# Patient Record
Sex: MALE | Race: WHITE | HISPANIC OR LATINO | Employment: OTHER
[De-identification: names, ages, dates, MRNs, and addresses within clinical notes are randomized per-mention and may not be internally consistent; named-entity substitution may affect disease eponyms.]

---

## 2018-07-11 ENCOUNTER — RECORDS - HEALTHEAST (OUTPATIENT)
Dept: ADMINISTRATIVE | Facility: OTHER | Age: 33
End: 2018-07-11

## 2018-07-16 ENCOUNTER — OFFICE VISIT - HEALTHEAST (OUTPATIENT)
Dept: SURGERY | Facility: CLINIC | Age: 33
End: 2018-07-16

## 2018-07-16 DIAGNOSIS — K42.9 UMBILICAL HERNIA WITHOUT OBSTRUCTION AND WITHOUT GANGRENE: ICD-10-CM

## 2018-07-16 DIAGNOSIS — K40.90 RIGHT INGUINAL HERNIA: ICD-10-CM

## 2018-07-16 ASSESSMENT — MIFFLIN-ST. JEOR: SCORE: 1688.56

## 2018-07-26 ENCOUNTER — ANESTHESIA - HEALTHEAST (OUTPATIENT)
Dept: SURGERY | Facility: AMBULATORY SURGERY CENTER | Age: 33
End: 2018-07-26

## 2018-07-27 ENCOUNTER — SURGERY - HEALTHEAST (OUTPATIENT)
Dept: SURGERY | Facility: AMBULATORY SURGERY CENTER | Age: 33
End: 2018-07-27

## 2018-07-27 ASSESSMENT — MIFFLIN-ST. JEOR: SCORE: 1712.37

## 2021-06-01 VITALS — WEIGHT: 184 LBS | BODY MASS INDEX: 29.57 KG/M2 | HEIGHT: 66 IN

## 2021-06-01 VITALS — BODY MASS INDEX: 30.66 KG/M2 | WEIGHT: 184 LBS | HEIGHT: 65 IN

## 2021-06-16 PROBLEM — K40.90 RIGHT INGUINAL HERNIA: Status: ACTIVE | Noted: 2018-07-16

## 2021-06-19 NOTE — PROGRESS NOTES
"HPI:  This is a 33 y.o. male here today with concerns of pain and bulging in his right groin and umbilicus area. He has noted this for the past few month(s). The symptoms have progressed and increased over this time. He comes in for evaluation secondary to the hernia causing enough issues to bother them with daily activities or chores.    Allergies:Review of patient's allergies indicates no known allergies.    History reviewed. No pertinent past medical history.    Past Surgical History:   Procedure Laterality Date     HIP SURGERY         CURRENT MEDS:      Family History   Problem Relation Age of Onset     No Medical Problems Mother      No Medical Problems Father         reports that he has never smoked. He has never used smokeless tobacco. He reports that he drinks alcohol. He reports that he does not use illicit drugs.    Review of Systems - Negative except right groin pain, Otherwise twelve system of review is negative.      Vitals:    07/16/18 1033   BP: 123/81   Patient Site: Left Arm   Patient Position: Sitting   Cuff Size: Adult Regular   Pulse: 73   SpO2: 96%   Weight: 184 lb (83.5 kg)   Height: 5' 4.5\" (1.638 m)       Body mass index is 31.1 kg/(m^2).    EXAM:  General: NAD   HEENT: normocephalic, PERRLA and EOMS intact  Mounth: Mucus membranes moist  Neck: Supple  Chest: Clear to auscultation bilaterally  CV: RRR  ABD: Soft nontender and nondistended, right inguinal hernia and umbilical hernia, reducible  EXT: Warm, pulses intact,   Neuro: Alert and oriented x3  Back: no CVA tenderness    Assessment/Plan: Pt with a right inguinal hernia and umbilical hernia. I discussed this at length with He.  I went over conservative management as well as surgical treatment of this.. I would plan on doing this via anlaparoscopic  approach with possible use of mesh. I went over the small risks of surgery including but not limited to bleeding and infection, anesthesia, recurrence rates and nerve injury. I discussed the " expected recovery time as well. Will get this scheduled. He will contact us to have this scheduled.      Fausto Aviles MD  [facility] Department of Surgery

## 2021-06-19 NOTE — ANESTHESIA CARE TRANSFER NOTE
Last vitals:   Vitals:    07/27/18 0835   BP: (P) 121/75   Pulse: (P) 77   Resp: (P) 16   Temp: (!) (P) 35.8  C (96.5  F)   SpO2: (P) 100%     Patient's level of consciousness is drowsy  Spontaneous respirations: yes  Maintains airway independently: yes  Dentition unchanged: yes  Oropharynx: oropharynx clear of all foreign objects    QCDR Measures:  ASA# 20 - Surgical Safety Checklist: WHO surgical safety checklist completed prior to induction  PQRS# 430 - Adult PONV Prevention: 4558F - Pt received => 2 anti-emetic agents (different classes) preop & intraop  ASA# 8 - Peds PONV Prevention: NA - Not pediatric patient, not GA or 2 or more risk factors NOT present  PQRS# 424 - Promise-op Temp Management: 4559F - At least one body temp DOCUMENTED => 35.5C or 95.9F within required timeframe  PQRS# 426 - PACU Transfer Protocol: - Transfer of care checklist used  ASA# 14 - Acute Post-op Pain: ASA14B - Patient did NOT experience pain >= 7 out of 10

## 2021-06-19 NOTE — ANESTHESIA POSTPROCEDURE EVALUATION
Patient: Binu Rivers  REPAIR, HERNIA, INGUINAL, LAPAROSCOPIC, REPAIR, HERNIA, UMBILICAL, OPEN  Anesthesia type: general    Patient location: PACU  Last vitals:   Vitals:    07/27/18 0947   BP: 126/86   Pulse: (!) 53   Resp: 16   Temp:    SpO2: 98%     Post vital signs: stable  Level of consciousness: awake and responds to simple questions  Post-anesthesia pain: pain controlled  Post-anesthesia nausea and vomiting: no  Pulmonary: unassisted, return to baseline  Cardiovascular: stable and blood pressure at baseline  Hydration: adequate  Anesthetic events: no    QCDR Measures:  ASA# 11 - Promise-op Cardiac Arrest: ASA11B - Patient did NOT experience unanticipated cardiac arrest  ASA# 12 - Promsie-op Mortality Rate: ASA12B - Patient did NOT die  ASA# 13 - PACU Re-Intubation Rate: ASA13B - Patient did NOT require a new airway mgmt  ASA# 10 - Composite Anes Safety: ASA10A - No serious adverse event    Additional Notes:

## 2021-06-19 NOTE — ANESTHESIA PREPROCEDURE EVALUATION
Anesthesia Evaluation      Patient summary reviewed   No history of anesthetic complications     Airway   Mallampati: II   Pulmonary - negative ROS and normal exam                          Cardiovascular - negative ROS  Rhythm: regular  Rate: normal,         Neuro/Psych - negative ROS     Endo/Other - negative ROS      GI/Hepatic/Renal - negative ROS           Dental - normal exam                        Anesthesia Plan  Planned anesthetic: general endotracheal    ASA 2   Induction: intravenous   Anesthetic plan and risks discussed with: patient and  services used    Post-op plan: routine recovery

## 2023-03-03 ENCOUNTER — APPOINTMENT (OUTPATIENT)
Dept: RADIOLOGY | Facility: HOSPITAL | Age: 38
End: 2023-03-03
Attending: PHYSICIAN ASSISTANT

## 2023-03-03 ENCOUNTER — HOSPITAL ENCOUNTER (EMERGENCY)
Facility: HOSPITAL | Age: 38
Discharge: HOME | End: 2023-03-03
Admitting: PHYSICIAN ASSISTANT

## 2023-03-03 VITALS
BODY MASS INDEX: 32.14 KG/M2 | HEART RATE: 80 BPM | SYSTOLIC BLOOD PRESSURE: 123 MMHG | HEIGHT: 66 IN | OXYGEN SATURATION: 98 % | RESPIRATION RATE: 16 BRPM | WEIGHT: 200 LBS | TEMPERATURE: 98.4 F | DIASTOLIC BLOOD PRESSURE: 87 MMHG
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
## 2023-03-03 DIAGNOSIS — S20.212A CONTUSION OF RIB ON LEFT SIDE, INITIAL ENCOUNTER: ICD-10-CM

## 2023-03-03 PROCEDURE — 99283 EMERGENCY DEPT VISIT LOW MDM: CPT

## 2023-03-03 PROCEDURE — 250N000013 HC RX MED GY IP 250 OP 250 PS 637: Performed by: PHYSICIAN ASSISTANT

## 2023-03-03 PROCEDURE — 99284 EMERGENCY DEPT VISIT MOD MDM: CPT | Mod: 25

## 2023-03-03 PROCEDURE — 71101 X-RAY EXAM UNILAT RIBS/CHEST: CPT | Mod: LT

## 2023-03-03 RX ORDER — OXYCODONE AND ACETAMINOPHEN 5; 325 MG/1; MG/1
1 TABLET ORAL EVERY 6 HOURS PRN
Qty: 10 TABLET | Refills: 0 | Status: SHIPPED | OUTPATIENT
Start: 2023-03-03

## 2023-03-03 RX ORDER — LIDOCAINE 4 G/G
1 PATCH TOPICAL ONCE
Status: DISCONTINUED | OUTPATIENT
Start: 2023-03-03 | End: 2023-03-03 | Stop reason: HOSPADM

## 2023-03-03 RX ORDER — IBUPROFEN 600 MG/1
600 TABLET, FILM COATED ORAL ONCE
Status: COMPLETED | OUTPATIENT
Start: 2023-03-03 | End: 2023-03-03

## 2023-03-03 RX ADMIN — LIDOCAINE 1 PATCH: 4 PATCH TOPICAL at 13:06

## 2023-03-03 RX ADMIN — IBUPROFEN 600 MG: 600 TABLET, FILM COATED ORAL at 13:06

## 2023-03-03 ASSESSMENT — ENCOUNTER SYMPTOMS
SHORTNESS OF BREATH: 0
BACK PAIN: 0
HEADACHES: 0
DIZZINESS: 0
NECK PAIN: 0
VOMITING: 0
ABDOMINAL PAIN: 0

## 2023-03-03 ASSESSMENT — ACTIVITIES OF DAILY LIVING (ADL): ADLS_ACUITY_SCORE: 35

## 2023-03-03 NOTE — ED PROVIDER NOTES
Emergency Department Encounter   NAME: Binu Rivers ; AGE: 38 year old male ; YOB: 1985 ; MRN: [id number] ; PCP: No primary care provider on file.   ED PROVIDER: Ursula Cruz PA-C    Evaluation Date & Time:   No admission date for patient encounter.    CHIEF COMPLAINT:  Fall      Impression and Plan   MDM: Binu Rivers is a 38 year old male with no pertinent history on file who presents to the ED by walk in for evaluation of a fall, chest wall pain.  The patient presents to the emergency department for evaluation of left-sided chest wall pain after a mechanical slip and fall on the ice last week.  Here in the ER, he is experiencing pain with twisting, certain movements, sleeping, and deep inspiration has reproducible tenderness over his lateral left rib cage.  Differential diagnosis includes was not limited to musculoskeletal strain, rib contusions, rib fractures, rib dislocation, traumatic pneumothorax and others.  X-ray imaging of his ribs and chest ordered.  He does not have any tenderness in the abdomen or flank concerning for intra-abdominal solid organ injury or hemorrhage.  No midline spinal tenderness.  He denies hitting his head, no headache, LOC, or neck pain.  No concern for traumatic intracranial or cervical spine injury.  Patient did drive himself to the ER, and will treat with Lidoderm patch and oral ibuprofen so that he is able to return home today.    X-ray returned and shows no evidence of fractures hemothorax or pneumothorax.  We did discuss the limitations of the x-ray, and possibility of missed occult rib fractures and patient verbalized understanding.  Suspect pain is likely due to rib contusion vs. Occult rib fracture. We discussed plan for motrin, ice/heat, lidoderm patches and prescription for Percocet as needed for breakthrough pain. Medication side effects discussed as well as plan for a 1 week follow up in clinic. We discussed splinting, deep inspirations  throughout the day - incentive spirometer provided. We reviewed concerning signs and symptoms to return to the ED - patient verbalized understanding and comfortable with the plan.     *Offered patient professional Azeri interpretor, however he preferred to speak English.       Medical Decision Making    History:    Supplemental history from: Documented in chart, if applicable    External Record(s) reviewed: Documented in chart, if applicable.    Work Up:    Chart documentation includes differential considered and any EKGs or imaging independently interpreted by provider, where specified.    In additional to work up documented, I considered the following work up: Documented in chart, if applicable.    External consultation:    Discussion of management with another provider: Documented in chart, if applicable    Complicating factors:    Care impacted by chronic illness: N/A    Care affected by social determinants of health: N/A    Disposition considerations: Discharge. I prescribed additional prescription strength medication(s) as charted. See documentation for any additional details.    ED COURSE:  12:10 PM I met and introduced myself to the patient. I gathered initial history and performed my physical exam. We discussed plan for initial workup.   1:00 PM Updated patient on imaging results. Discussed plan for discharge.    At the conclusion of the encounter I discussed the results of all the tests and the disposition. The questions were answered. The patient or family acknowledged understanding and was agreeable with the care plan.    FINAL IMPRESSION:    ICD-10-CM    1. Fall due to slipping on ice or snow, initial encounter  W00.9XXA       2. Contusion of rib on left side, initial encounter  S20.212A             MEDICATIONS GIVEN IN THE EMERGENCY DEPARTMENT:  Medications   Lidocaine (LIDOCARE) 4 % Patch 1 patch (1 patch Transdermal $Patch/Med Applied 3/3/23 1306)   ibuprofen (ADVIL/MOTRIN) tablet 600 mg (600 mg  Oral $Given 3/3/23 2710)         NEW PRESCRIPTIONS STARTED AT TODAY'S ED VISIT:  New Prescriptions    OXYCODONE-ACETAMINOPHEN (PERCOCET) 5-325 MG TABLET    Take 1 tablet by mouth every 6 hours as needed for pain         HPI   Patient information was obtained from: Patient     Use of Intrepreter: Yes (Phone) - Language Mohawk     Binu Rivers is a 38 year old male with no pertinent history on file who presents to the ED by walk in for evaluation of a fall, chest wall pain.     The patient reports that last Friday (2/24), he slipped on the ice and fell onto his left side. Endorses left-sided chest wall pain that is exacerbated with deep breaths and twisting. He did not hit his head. The patient is not on blood thinning medication. Ibuprofen has provided no relief, so he took one of his wife's old oxycodone's which did help relieve his pain. No medical problems noted. No tobacco use.    He otherwise denies headache, neck pain, back pain, abdominal pain, or any other complaints at this time.    REVIEW OF SYSTEMS:  Review of Systems   Respiratory: Negative for shortness of breath.    Gastrointestinal: Negative for abdominal pain and vomiting.   Musculoskeletal: Negative for back pain and neck pain.        Positive for chest wall pain (left).   Neurological: Negative for dizziness, syncope and headaches.   All other systems reviewed and are negative.      Medical History     History reviewed. No pertinent past medical history.    Past Surgical History:   Procedure Laterality Date     FRACTURE SURGERY       HIP SURGERY       AL LAP,INGUINAL HERNIA REPR,INITIAL Right 7/27/2018    Procedure: REPAIR, HERNIA, INGUINAL, LAPAROSCOPIC;  Surgeon: Fausto Aviles MD;  Location: [facility];  Service: General     [facility] REPAIR UMBILICAL SAAD,5+Y/O,REDUC N/A 7/27/2018    Procedure: REPAIR, HERNIA, UMBILICAL, OPEN;  Surgeon: Fausto Aviles MD;  Location: [facility];  Service: General       Family History  "  Problem Relation Age of Onset     No Known Problems Mother      No Known Problems Father        Social History     Tobacco Use     Smoking status: Never     Smokeless tobacco: Never   Substance Use Topics     Alcohol use: Yes     Alcohol/week: 28.0 standard drinks     Comment: Alcoholic Drinks/day: Noted on preop     Drug use: No       oxyCODONE-acetaminophen (PERCOCET) 5-325 MG tablet  HYDROcodone-acetaminophen (NORCO) 5-325 mg per tablet  sulfamethoxazole-trimethoprim (SEPTRA DS) 800-160 mg per tablet          Physical Exam     First Vitals:  Patient Vitals for the past 24 hrs:   BP Temp Temp src Pulse Resp SpO2 Height Weight   03/03/23 1300 123/87 -- -- 80 16 98 % -- --   03/03/23 1147 (!) 164/99 98.4  F (36.9  C) Oral 80 18 98 % 1.676 m (5' 6\") 90.7 kg (200 lb)         PHYSICAL EXAM:   Physical Exam  Vitals and nursing note reviewed.   Constitutional:       General: He is not in acute distress.     Appearance: Normal appearance. He is not ill-appearing or toxic-appearing.   HENT:      Head: Normocephalic and atraumatic.   Eyes:      Conjunctiva/sclera: Conjunctivae normal.   Cardiovascular:      Rate and Rhythm: Normal rate and regular rhythm.      Heart sounds: Normal heart sounds.   Pulmonary:      Effort: Pulmonary effort is normal. No respiratory distress.      Breath sounds: Normal breath sounds. No wheezing, rhonchi or rales.   Chest:      Chest wall: Tenderness (left lateral ribcage; no step offs, ecchymosis or crepitus) present.   Abdominal:      General: Abdomen is flat. There is no distension.      Palpations: Abdomen is soft.      Tenderness: There is no abdominal tenderness. There is no left CVA tenderness, guarding or rebound.   Musculoskeletal:      Cervical back: Normal range of motion and neck supple.      Comments: No midline spinal tenderness or step offs.    Skin:     General: Skin is warm and dry.   Neurological:      Mental Status: He is alert.         Results     LAB:  All pertinent labs " reviewed and interpreted  Labs Ordered and Resulted from Time of ED Arrival to Time of ED Departure - No data to display    RADIOLOGY:  Ribs XR, unilat 3 views + PA chest,  left   Final Result   IMPRESSION: The visualized heart and lungs are negative. No rib fractures.            I, Teresa Jacques, am serving as a scribe to document services personally performed by Ursula Cruz PA-C, based on my observation and the provider's statements to me. I, Ursula Cruz PA-C attest that Teresa Jacques is acting in a scribe capacity, has observed my performance of the services and has documented them in accordance with my direction.       Ursula Cruz PA-C   Emergency Medicine   [facility] EMERGENCY DEPARTMENT     Ursula Cruz PA-C  03/03/23 4884

## 2023-03-03 NOTE — DISCHARGE INSTRUCTIONS
As we discussed, your chest x-ray did not show any rib fractures though I suspect bruised ribs or a subtle crack in the ribs as a source of your pain.  Please continue ice or heat as well as regularly scheduled Motrin for pain relief.  I will send you home with a prescription for Percocet which is oxycodone with Tylenol for breakthrough pain.  This is a strong pain medicine and can make you drowsy.  You may not take it while working, driving, or operating heavy machinery.  I would advise that you follow-up closely in your primary care clinic in 1 week for recheck.  If it anytime you develop fever, cough, difficulty breathing or worsening pain, please return to the ER for further evaluation.  I will send you home with an incentive spirometer device, and I advised that you use this machine at least 3 times a day to move air through your lower lungs to avoid developing an infection.    Your blood pressure was elevated in the emergencydepartment today and requires recheck and close follow-up in your primary care clinic. Untreated blood pressure can cause serious complications including, but not limited to stroke, heart attack/failure, and kidney disease.  Please make a close follow-up appointment to have this recheck performed. Please return to the emergency department immediately if you develop a severe headache, vision changes, chest pain, shortness of breath, orabdominal pain.

## 2023-03-03 NOTE — ED TRIAGE NOTES
Patient slipped and fell on ice last weekend. Patient is having pain on left flank/ribs. Patient rates pain 8/10. Patient denies LOC. Patient was able to take ibuprofen which was somewhat effective. Patient also states that he took some of his wife's oxycodone which was helpful with sleep.

## 2024-02-12 ENCOUNTER — OFFICE VISIT (OUTPATIENT)
Dept: INTERNAL MEDICINE | Facility: CLINIC | Age: 39
End: 2024-02-12

## 2024-02-12 VITALS
HEART RATE: 74 BPM | OXYGEN SATURATION: 99 % | TEMPERATURE: 98.2 F | RESPIRATION RATE: 16 BRPM | WEIGHT: 197.3 LBS | DIASTOLIC BLOOD PRESSURE: 64 MMHG | HEIGHT: 66 IN | SYSTOLIC BLOOD PRESSURE: 120 MMHG | BODY MASS INDEX: 31.71 KG/M2

## 2024-02-12 DIAGNOSIS — G89.29 CHRONIC PAIN OF BOTH KNEES: Primary | ICD-10-CM

## 2024-02-12 DIAGNOSIS — M25.561 CHRONIC PAIN OF BOTH KNEES: Primary | ICD-10-CM

## 2024-02-12 DIAGNOSIS — M25.562 CHRONIC PAIN OF BOTH KNEES: Primary | ICD-10-CM

## 2024-02-12 PROCEDURE — 99203 OFFICE O/P NEW LOW 30 MIN: CPT | Mod: 25 | Performed by: NURSE PRACTITIONER

## 2024-02-12 PROCEDURE — 20610 DRAIN/INJ JOINT/BURSA W/O US: CPT | Mod: 50 | Performed by: NURSE PRACTITIONER

## 2024-02-12 RX ORDER — TRIAMCINOLONE ACETONIDE 40 MG/ML
40 INJECTION, SUSPENSION INTRA-ARTICULAR; INTRAMUSCULAR ONCE
Status: COMPLETED | OUTPATIENT
Start: 2024-02-12 | End: 2024-02-12

## 2024-02-12 RX ADMIN — TRIAMCINOLONE ACETONIDE 40 MG: 40 INJECTION, SUSPENSION INTRA-ARTICULAR; INTRAMUSCULAR at 16:25

## 2024-02-12 NOTE — PROGRESS NOTES
"  Assessment & Plan   Problem List Items Addressed This Visit    None  Visit Diagnoses       Chronic pain of both knees    -  Primary    Relevant Medications    triamcinolone (KENALOG-40) injection 40 mg (Completed)    triamcinolone (KENALOG-40) injection 40 mg (Completed)    Other Relevant Orders    Physical Therapy Referral    Large Joint/Bursa injection and/or drainage (Shoulder, Knee) (Completed)    Large Joint/Bursa injection and/or drainage (Shoulder, Knee) (Completed)           - Referral for physical therapy       BMI  Estimated body mass index is 31.85 kg/m  as calculated from the following:    Height as of this encounter: 1.676 m (5' 6\").    Weight as of this encounter: 89.5 kg (197 lb 4.8 oz).         Abigail Schmid is a 38 year old, presenting for the following health issues:  Imm/Inj (Cortisone shot)        2/12/2024     3:53 PM   Additional Questions   Roomed by Lena   Accompanied by son     History of Present Illness       Reason for visit:  Corticosteeoid injection    He eats 0-1 servings of fruits and vegetables daily.He consumes 3 sweetened beverage(s) daily.He exercises with enough effort to increase his heart rate 9 or less minutes per day.  He exercises with enough effort to increase his heart rate 3 or less days per week.   He is taking medications regularly.     He has had knee pain for the past 4 years. He was in two different accidents at work before the pain started, about 7 years ago he injured the left knee and 12 years ago injured the right knee. Both knees feel like they crack when he walks. He had b/l knee injections about 1 year ago, he had about 8 months of relief.         Objective    /64   Pulse 74   Temp 98.2  F (36.8  C) (Oral)   Resp 16   Ht 1.676 m (5' 6\")   Wt 89.5 kg (197 lb 4.8 oz)   SpO2 99%   BMI 31.85 kg/m    Body mass index is 31.85 kg/m .    Physical Exam   GENERAL: alert and no distress  MS: knees appear atraumatic. No joint line tenderness or " crepitation       Procedure:  Bilateral knee injection    Indications:  Knee pain.    Description of procedure:  The patient was given informed consent prior to the procedure.  A lateral inferior approach was marked and cleaned with alcohol.  Using a 25G needle, the joint space was encountered.    1 ml of lidocaine with epinephrine and 40 mg of Kenalog was drawn up.  The fluid was injected easily into the left knee. The same procedure was repeated for the right knee.     No immediate complications.  Aftercare instructions given            Signed Electronically by: Talisha Borrero NP